# Patient Record
Sex: FEMALE | URBAN - METROPOLITAN AREA
[De-identification: names, ages, dates, MRNs, and addresses within clinical notes are randomized per-mention and may not be internally consistent; named-entity substitution may affect disease eponyms.]

---

## 2019-10-04 ENCOUNTER — APPOINTMENT (RX ONLY)
Dept: URBAN - METROPOLITAN AREA CLINIC 152 | Facility: CLINIC | Age: 39
Setting detail: DERMATOLOGY
End: 2019-10-04

## 2019-10-04 DIAGNOSIS — B36.0 PITYRIASIS VERSICOLOR: ICD-10-CM | Status: INADEQUATELY CONTROLLED

## 2019-10-04 PROCEDURE — ? COUNSELING

## 2019-10-04 PROCEDURE — 99203 OFFICE O/P NEW LOW 30 MIN: CPT

## 2019-10-04 PROCEDURE — ? PRESCRIPTION MEDICATION MANAGEMENT

## 2019-10-04 PROCEDURE — ? PRESCRIPTION

## 2019-10-04 RX ORDER — KETOCONAZOLE 20 MG/G
CREAM TOPICAL BID
Qty: 1 | Refills: 3 | Status: ERX | COMMUNITY
Start: 2019-10-04

## 2019-10-04 RX ORDER — FLUCONAZOLE 200 MG/1
TABLET ORAL QD
Qty: 7 | Refills: 0 | Status: ERX | COMMUNITY
Start: 2019-10-04

## 2019-10-04 RX ADMIN — FLUCONAZOLE: 200 TABLET ORAL at 14:19

## 2019-10-04 RX ADMIN — KETOCONAZOLE: 20 CREAM TOPICAL at 14:19

## 2019-10-04 ASSESSMENT — LOCATION DETAILED DESCRIPTION DERM
LOCATION DETAILED: LEFT LATERAL SUPERIOR CHEST
LOCATION DETAILED: RIGHT LATERAL SUPERIOR CHEST
LOCATION DETAILED: LEFT INFERIOR ANTERIOR NECK
LOCATION DETAILED: RIGHT ANTERIOR PROXIMAL UPPER ARM
LOCATION DETAILED: LEFT ANTERIOR PROXIMAL UPPER ARM

## 2019-10-04 ASSESSMENT — LOCATION SIMPLE DESCRIPTION DERM
LOCATION SIMPLE: RIGHT UPPER ARM
LOCATION SIMPLE: LEFT ANTERIOR NECK
LOCATION SIMPLE: LEFT UPPER ARM
LOCATION SIMPLE: CHEST

## 2019-10-04 ASSESSMENT — LOCATION ZONE DERM
LOCATION ZONE: ARM
LOCATION ZONE: TRUNK
LOCATION ZONE: NECK

## 2019-10-04 NOTE — PROCEDURE: PRESCRIPTION MEDICATION MANAGEMENT
Plan: If rash unresolved in 2-3 weeks, will prescribe TAC 0.1% cream
Detail Level: Zone
Render In Strict Bullet Format?: No
Initiate Treatment: ketoconazole 2 % topical cream BID\\nfluconazole 200 mg tablet QD

## 2019-10-30 RX ORDER — KETOCONAZOLE 20 MG/G
CREAM TOPICAL BID
Qty: 1 | Refills: 3 | Status: ERX

## 2019-10-30 RX ORDER — FLUCONAZOLE 200 MG/1
TABLET ORAL QD
Qty: 7 | Refills: 0 | Status: ERX

## 2024-01-26 ENCOUNTER — APPOINTMENT (RX ONLY)
Dept: URBAN - METROPOLITAN AREA CLINIC 152 | Facility: CLINIC | Age: 44
Setting detail: DERMATOLOGY
End: 2024-01-26

## 2024-01-26 DIAGNOSIS — L20.89 OTHER ATOPIC DERMATITIS: ICD-10-CM

## 2024-01-26 PROCEDURE — ? PRESCRIPTION

## 2024-01-26 PROCEDURE — ? DIAGNOSIS COMMENT

## 2024-01-26 PROCEDURE — ? PRESCRIPTION MEDICATION MANAGEMENT

## 2024-01-26 PROCEDURE — 99203 OFFICE O/P NEW LOW 30 MIN: CPT

## 2024-01-26 PROCEDURE — ? COUNSELING

## 2024-01-26 RX ORDER — TRIAMCINOLONE ACETONIDE 1 MG/G
OINTMENT TOPICAL
Qty: 80 | Refills: 3 | Status: ERX | COMMUNITY
Start: 2024-01-26

## 2024-01-26 RX ADMIN — TRIAMCINOLONE ACETONIDE: 1 OINTMENT TOPICAL at 00:00

## 2024-01-26 NOTE — HPI: RASH
How Severe Is Your Rash?: mild
Is This A New Presentation, Or A Follow-Up?: Rash
Additional History: Pt is here for rash that appeared a months has been using hydrocortisone but not effective.

## 2024-01-26 NOTE — PROCEDURE: DIAGNOSIS COMMENT
Comment: Unclear etiology, but favor eczema or ACD. Unusual location for ACD. No med changes. Flank seems more eczematous and under breast could be more psoriasiform. Consider biopsy if worsening/not responding.  recommended pt to initiate triamcinolone acetonide 0.1 % topical ointment pt will gives  us a call  if not effective to do patch testing.
Detail Level: Simple
Render Risk Assessment In Note?: no